# Patient Record
Sex: MALE | Race: WHITE | NOT HISPANIC OR LATINO | Employment: FULL TIME | URBAN - METROPOLITAN AREA
[De-identification: names, ages, dates, MRNs, and addresses within clinical notes are randomized per-mention and may not be internally consistent; named-entity substitution may affect disease eponyms.]

---

## 2022-11-22 ENCOUNTER — IMMUNIZATION (OUTPATIENT)
Dept: PEDIATRICS | Facility: CLINIC | Age: 47
End: 2022-11-22
Payer: MEDICAID

## 2022-11-22 VITALS — TEMPERATURE: 98 F

## 2022-11-22 DIAGNOSIS — F17.200 NEEDS SMOKING CESSATION EDUCATION: ICD-10-CM

## 2022-11-22 PROCEDURE — 90471 IMMUNIZATION ADMIN: CPT | Mod: PBBFAC,PN

## 2022-11-22 NOTE — PROGRESS NOTES
SUBJECTIVE:  Balta Escobar is a 47 y.o. male here accompanied by child, who is a historian.    HPI  Pt presents to clinic for flu shot. No fever in the last 24 hours. Baby aspirin in the last 24 hours.    [facility]ev[facility] allergies, medications, history, and problem list were updated as appropriate.    Review of Systems  A comprehensive review of symptoms was completed and negative except as noted in the HPI.    OBJECTIVE:  Vital signs  Vitals:    11/22/22 1156   Temp: 97.7 °F (36.5 °C)   TempSrc: Oral        Physical Exam      ASSESSMENT/PLAN:  There are no diagnoses linked to this encounter.     No visits with results within 1 Day(s) from this visit.   Latest known visit with results is:   No results found for any previous visit.       Follow Up:  No follow-ups on file.

## 2024-12-20 ENCOUNTER — CLINICAL SUPPORT (OUTPATIENT)
Dept: PEDIATRICS | Facility: CLINIC | Age: 49
End: 2024-12-20
Payer: MEDICAID

## 2024-12-20 DIAGNOSIS — Z23 IMMUNIZATION DUE: Primary | ICD-10-CM
